# Patient Record
Sex: MALE | Race: WHITE | NOT HISPANIC OR LATINO | URBAN - NONMETROPOLITAN AREA
[De-identification: names, ages, dates, MRNs, and addresses within clinical notes are randomized per-mention and may not be internally consistent; named-entity substitution may affect disease eponyms.]

---

## 2018-12-24 NOTE — PATIENT DISCUSSION
HTN RETINOPATHY: Stressed importance of good blood pressure. Stressed the importance of regular exams with PCP to control blood pressure, glucose, and lipid levels to minimize risk of vascular complications. OCT COMPLETED TODAY, POOR QUALITY OS EVEN AFTER MULTIPLE ATTEMPTS. POSSIBLY RELATED TO PREVIOUS STROKE.

## 2019-03-25 NOTE — PATIENT DISCUSSION
HYPERTENSIVE RETINOPATHY OU: Multiple CWS, closer to macula, no improvement. Will refer patient to Dr. Harry Balderas for retinal evaluation.

## 2019-04-30 NOTE — PATIENT DISCUSSION
New Prescription: Pred Forte (prednisolone acetate): drops,suspension: 1% 1 drop four times a day into affected eye 04-

## 2019-06-25 NOTE — PATIENT DISCUSSION
right eye:  [facility] discussed with Dr. Kishan Sanchez patients vision and treatment , Dr. Kishan Sanchez will see patient today at [facility] .

## 2019-06-26 NOTE — PATIENT DISCUSSION
New Prescription: prednisolone acetate (prednisolone acetate): drops,suspension: 1% 1 drop as directed into affected eye 06-

## 2019-06-26 NOTE — PATIENT DISCUSSION
RETURN TO DR Morris Left FOR POSTOP WEEK 1 EXAM. THEN RETURN TO DR Suyapa Plunkett IN [place] IN 3 WEEKS, SOONER PRN.

## 2019-06-26 NOTE — PATIENT DISCUSSION
Stopped Today: Pred Forte (prednisolone acetate): drops,suspension: 1% 1 drop four times a day into affected eye 04-

## 2019-06-26 NOTE — PATIENT DISCUSSION
New Prescription: atropine (atropine): drops: 1% 1 drop twice a day as directed into affected eye 06-

## 2019-06-26 NOTE — PATIENT DISCUSSION
New Prescription: ciprofloxacin (ciprofloxacin): drops: 0.3% 1 drop four times a day as directed into affected eye 06-

## 2019-06-26 NOTE — PATIENT DISCUSSION
POST-OP DAY 1 EXAM: S/P PPV, VANCOMYCIN, MOXIFLOXACIN OD. Doing well today. Retina attached. IOP within acceptable limits. Start eyedrops in the surgical eye as instructed: Pred 4x/day, Cipro 4x/day, Atropine 2x/day. Take tylenol or ibuprofen for any mild eye pain or pressure. Retinal detachment and endophthalmitis precautions reviewed. Instructed to call immediately for worsening vision, eye pain, or eye discharge.

## 2019-07-01 NOTE — PATIENT DISCUSSION
Continue: prednisolone acetate (prednisolone acetate): drops,suspension: 1% 1 drop as directed into affected eye 06-

## 2019-07-01 NOTE — PATIENT DISCUSSION
PANOPHTHALMITIS; S/P PPV 1 WEEK: PATIENT NOT TAKING PF1% DROPS. TAKING CIPROFLOXACIN AND ATROPINE AS DIRECTED. WILL HAVE PATIENT CALL OFFICE BACK WHEN HE GETS HOME TO CONFIRM WHAT DROPS HE HAS. INSTRUCTED PATIENT ON USING DROPS AS DIRECTED.

## 2019-07-01 NOTE — PATIENT DISCUSSION
Continue: ciprofloxacin (ciprofloxacin): drops: 0.3% 1 drop four times a day as directed into affected eye 06-

## 2019-07-08 NOTE — PATIENT DISCUSSION
PANOPHTHALMITIS; S/P PPV 2 WEEKS: TAKING CIPROFLOXACIN (RAN OUT, INTENTS ON REFILLING) AND ATROPINE AS DIRECTED. WILL HAVE PATIENT CALL OFFICE BACK WHEN HE GETS HOME TO CONFIRM WHAT DROPS HE HAS. INSTRUCTED PATIENT ON USING DROPS AS DIRECTED.

## 2019-07-16 NOTE — PATIENT DISCUSSION
POST-OP WEEK 3 EXAM: S/P PPV, VANCOMYCIN, MOXIFLOXACIN OD. FOR BACTERIAL ENDOPHTHALMITIS. Doing well today. Retina attached. IOP within acceptable limits. Continue eyedrops in the surgical eye as instructed. CONTNIUE CIPROFLOXACIN 3X/DAY, AND PREDNISOLONE 3X/DAY OD. Retinal detachment and endophthalmitis precautions reviewed. Instructed to call immediately for worsening vision, eye pain, or eye discharge.

## 2019-08-13 NOTE — PATIENT DISCUSSION
POST-OP MONTH 2 EXAM: S/P PPV, VANCOMYCIN, MOXIFLOXACIN OD. FOR BACTERIAL ENDOPHTHALMITIS. Doing well today. Retina attached. CONTINUE PREDNISOLONE 2X/DAY UNTIL NEXT VISIT. OCULAR HTN, OD: PRESCRIBED COSOPT 2X/DAY OD.  STOP CIPRO DROPS. Retinal detachment and endophthalmitis precautions reviewed. Instructed to call immediately for worsening vision, eye pain, or eye discharge.

## 2019-08-13 NOTE — PATIENT DISCUSSION
New Prescription: Cosopt (dorzolamide-timolol): drops: 2-0.5% 1 drop twice a day as directed into right eye 08-

## 2019-09-10 NOTE — PATIENT DISCUSSION
POST-OP MONTH 3 EXAM: S/P PPV, VANCOMYCIN, MOXIFLOXACIN OD. FOR BACTERIAL ENDOPHTHALMITIS. Doing well today. Retina attached. Retinal detachment and endophthalmitis precautions reviewed. Instructed to call immediately for worsening vision, eye pain, or eye discharge.

## 2019-09-10 NOTE — PATIENT DISCUSSION
New Prescription: prednisolone acetate (prednisolone acetate): drops,suspension: 1% 1 drop four times a day into right eye 09-

## 2019-09-10 NOTE — PATIENT DISCUSSION
Continue: Cosopt (dorzolamide-timolol): drops: 2-0.5% 1 drop twice a day as directed into right eye 08-

## 2019-09-24 NOTE — PATIENT DISCUSSION
POSTOP IRITIS, OD: BEGIN PRED FORTE TAPER TOMORROW  3X/DAY OD FOR 2 WEEKS THEN 2X/DAY FOR 2 WEEKS THEN 1X/DAY UNTIL NEXT VISIT. RETURN AS SCHEDULED.

## 2019-09-24 NOTE — PATIENT DISCUSSION
Continue: prednisolone acetate (prednisolone acetate): drops,suspension: 1% 1 drop four times a day into right eye 09-

## 2019-10-01 NOTE — PATIENT DISCUSSION
S/P PCIOL OU: VISION IS DOING WELL. MAY USE OTC READERS IF DESIRES. SPECTACLE PRESCRIPTION GIVEN TO PATIENT AS AN OPTIONAL FILL. VISION FOR DISTANCE IS DOING WELL WITHOUT CORRECTION.

## 2019-10-01 NOTE — PATIENT DISCUSSION
POST OP IRITIS OD: STILL TRACE CELL PRESENT.  CONTINUE PF AS DIRECTED BY DR. Ugo Kumar (3,2,1 TAPER) AND FOLLOW UP AS AS DIRECTED BY DR. Ugo Kumar

## 2019-11-05 NOTE — PATIENT DISCUSSION
OHTN (Treatment) Counseling: I have explained to the patient at length the diagnosis of ocular hypertension and its pathophysiology. I have reviewed the regimen of drops with the patient and stressed the importance of compliance with treatment and follow-up appointments. Return for follow-up as scheduled.

## 2019-11-14 NOTE — PATIENT DISCUSSION
PROLIFERATIVE DIABETIC RETINOPATHY:  I have talked with the patient about the nature of proliferative diabetic retinopathy and the significant risk of visual complications if not treated appropriately. For this reason regular follow-up with an eye doctor is essential.  I have also advised aggressive blood sugar and blood pressure control to minimize the risk of further damage.

## 2019-11-14 NOTE — PATIENT DISCUSSION
PVD OS: LARGE. NO SIGN OF RETINAL DETACHMENT OR BREAK DURING EXAMINATION WITH 90D AND 20D WITH BIO TECHNIQUE. STRESSED TO PATIENT TO THAT IF THERE ARE ANY SYMPTOMS OF A RETINAL DETACHMENT OR TEAR, HE SHOULD IMMEDIATELY CALL THE OFFICE. REFERRING TO DR. Suyapa Plunkett FOR AN EXAM THIS TUESDAY IN [facility].

## 2019-11-19 NOTE — PATIENT DISCUSSION
RETINA IS ATTACHED OU: S/P PPV OD; PVD OS; NO HOLES OR TEARS SEEN ON DILATED EXAM TODAY.  RETINAL DETACHMENT SIGNS AND SYMPTOMS REVIEWED

## 2020-01-14 NOTE — PATIENT DISCUSSION
OCULAR HTN, OD: PATIENT WAS USING COSOPT ONCE DAILY. RECOMMEND COSOPT 2X/DAY OD. RETURN AS SCHEDULED TO DR Randi John.

## 2020-03-30 NOTE — PATIENT DISCUSSION
OCULAR HTN OD: IOP WITHIN ACCEPTABLE LIMITS. CONTINUE COSOPT BID OD. WILL CONSIDER DISCONTINUING DROPS LATER IS IOP DROPS.  WILL SEE BACK IN 3-4 MONTHS FOR AN IOP CHECK

## 2020-07-28 NOTE — PATIENT DISCUSSION
MILD NONPROLIFERATIVE DIABETIC RETINOPATHY, OU:  RETURN FOR FOLLOW-UP AS SCHEDULED FOR DILATED EYE EXAM.

## 2020-08-10 NOTE — PATIENT DISCUSSION
POAG, OU: INTRAOCULAR PRESSURE IS WITHIN ACCEPTABLE LIMITS. PT INSTRUCTED TO CONTINUE COSOPT AND RETURN FOR FOLLOW-UP AS SCHEDULED.

## 2020-08-10 NOTE — PATIENT DISCUSSION
PARKER OU: USE ARTIFICIAL TEARS 2-3 TIMES A DAY, GAVE PT SAMPLE OF THERA TEARS . RETURN FOR FOLLOW-UP AS SCHEDULED.

## 2020-08-10 NOTE — PATIENT DISCUSSION
DRY EYES : Discussed with patient the importance of keeping the eye moist and the symptoms associated with dry eyes including blurry vision, tearing, burning, and adilson sensation. Advised patient to minimize use of any fans blowing directly on the face. Advised patient to continue with artificial tears 2-3 times daily.

## 2020-12-10 NOTE — PATIENT DISCUSSION
POAG, OU: INTRAOCULAR PRESSURE IS WITHIN ACCEPTABLE LIMITS. PT INSTRUCTED TO CONTINUE WITHOUT DROPS AT THIS TIME  AND RETURN FOR FOLLOW-UP AS SCHEDULED.

## 2021-04-02 ENCOUNTER — IMPORTED ENCOUNTER (OUTPATIENT)
Dept: URBAN - NONMETROPOLITAN AREA CLINIC 1 | Facility: CLINIC | Age: 46
End: 2021-04-02

## 2021-04-02 PROBLEM — H52.4: Noted: 2021-04-02

## 2021-04-02 PROBLEM — H52.223: Noted: 2021-04-02

## 2021-04-02 PROBLEM — H52.13: Noted: 2021-04-02

## 2021-04-02 PROCEDURE — 92004 COMPRE OPH EXAM NEW PT 1/>: CPT

## 2021-04-02 PROCEDURE — 92015 DETERMINE REFRACTIVE STATE: CPT

## 2021-04-02 NOTE — PATIENT DISCUSSION
Compound Myopic Astigmatism OU -  discussed findings w/patient-  new spectacle Rx issued-  continue to monitor yearly or prn; 's Notes: MR 4/2/2021DFE 4/2/2021

## 2021-07-01 NOTE — PATIENT DISCUSSION
NONPROLIFERATIVE DIABETIC RETINOPATHY- CONTINUE TO FOLLOW, KEEP BS UNDER CONTROL, ANY CHANGES CALL OFFICE FOR AN APPT

## 2021-07-01 NOTE — PATIENT DISCUSSION
POAG OU: IOP ELEVATED. PATIENT IS TO START USING COMBIGAN BID OU INSTEAD OF JUST USING ONCE A DAY. WILL WATCH AND SEE IF IOP LOWERS. OCT AND PHOTOS APPEAR NORMAL. WILL SEE BACK IN 3 MONTHS.

## 2022-02-14 NOTE — PATIENT DISCUSSION
INSTRUCTED PATIENT TO INCREASE AMOUNT OF TIMES ARTIFICIAL TEARS ARE INSTILLED PER DAY TO IMPROVE BLURRY VISION.

## 2022-04-09 ASSESSMENT — VISUAL ACUITY
OU_CC: 20/30
OD_SC: 20/30
OS_SC: 20/40

## 2022-04-09 ASSESSMENT — TONOMETRY
OS_IOP_MMHG: 15
OD_IOP_MMHG: 15

## 2022-08-08 NOTE — PROCEDURE NOTE: CLINICAL
PROCEDURE NOTE: Punctal Plugs, Extended OU. Diagnosis: Dry Eye Syndrome. Prior to treatment, the risks/benefits/alternatives were discussed. The patient wished to proceed with procedure. Extended duration plugs were inserted. Brand: Dura. Size: 0.4mm Location: both lower punctum. Patient tolerated procedure well. There were no complications. Post procedure instructions given. Ab [place]

## 2022-08-26 NOTE — PATIENT DISCUSSION
A posterior vitreous detachment (PVD) is a condition of the eye in which the vitreous membrane separates from the retina. I have discussed the possibility of a retinal tear or detachment. The signs/symptoms of a retinal tear/detachment were reviewed including, but not limited to, A sudden increase in size and number of floaters, indicating a retinal tear may be occurring, a sudden appearance of flashes (which could be the first stage of a retinal tear or detachment), having a shadow appear in the periphery of your field of vision, seeing a gray curtain moving across your field of vision, and/or a sudden decrease in your vision. The patient was instructed to contact us immediately if they noticed any of these signs or symptoms.
Continue current management.
Continue dorzolamide-timolol bid ou. IOP elevated but will watch for now.
Discussed importance of compliance with ocular medications and follow up exams to prevent loss of vision.
Early posterior capsular opacification was noted. It was not causing any decreased vision or glare at this time. No treatment was recommended at this time.
I have discussed with the patient the importance of controlling blood glucose, blood pressure and lipid levels to minimize the risk of developing retinal disease. The importance of an annual dilated eye exam and regular medical follow up care was explained. It was further explained that the patient is at a higher risk for swelling in the back of the eye after ocular surgery.  In the event of post-surgical swelling, the patient understands that he/she may require additional treatment, including a referral to a retinal specialist.
INSTRUCTED PATIENT TO INCREASE AMOUNT OF TIMES ARTIFICIAL TEARS ARE INSTILLED PER DAY TO IMPROVE BLURRY VISION.
It was discussed with the patient the importance of good control of their blood sugar, blood pressure, cholesterol, diet, exercise and weight under the guidance of their diabetic doctor to prevent/halt diabetic retinopathy.
Punctal plugs placed at todays visit to help with dryness.
Stable.
The patient has hypertensive retinopathy, a disorder of the retina resulting from damaged retinal blood vessels secondary to high blood pressure. Most cases require no treatment other than to control the underlying hypertension. Diagnosis, treatment and follow-up recommendations have been discussed with the patient.
monitor.
retainers

## 2022-09-13 NOTE — PATIENT DISCUSSION
I have discussed with the patient the importance of controlling blood glucose, blood pressure and lipid levels to minimize the risk of developing retinal disease. The importance of an annual dilated eye exam and regular medical follow up care was explained. It was further explained that the patient is at a higher risk for swelling in the back of the eye after ocular surgery.  In the event of post-surgical swelling, the patient understands that he/she may require additional treatment, including a referral to a retinal specialist.

## 2023-01-11 NOTE — PATIENT DISCUSSION
The IOP is borderline OD, in normal range OS. The patient will continue the current management at this time: dorzolamide-timolol BID OU.